# Patient Record
Sex: FEMALE | Race: WHITE | NOT HISPANIC OR LATINO | Employment: OTHER | URBAN - METROPOLITAN AREA
[De-identification: names, ages, dates, MRNs, and addresses within clinical notes are randomized per-mention and may not be internally consistent; named-entity substitution may affect disease eponyms.]

---

## 2022-02-14 ENCOUNTER — OFFICE VISIT (OUTPATIENT)
Dept: ENDOCRINOLOGY | Facility: CLINIC | Age: 77
End: 2022-02-14

## 2022-02-14 ENCOUNTER — LAB (OUTPATIENT)
Dept: LAB | Facility: HOSPITAL | Age: 77
End: 2022-02-14

## 2022-02-14 VITALS
HEIGHT: 62 IN | BODY MASS INDEX: 30.91 KG/M2 | WEIGHT: 168 LBS | OXYGEN SATURATION: 96 % | HEART RATE: 98 BPM | SYSTOLIC BLOOD PRESSURE: 120 MMHG | DIASTOLIC BLOOD PRESSURE: 74 MMHG

## 2022-02-14 DIAGNOSIS — E06.3 AUTOIMMUNE HYPOTHYROIDISM: Primary | ICD-10-CM

## 2022-02-14 PROCEDURE — 84439 ASSAY OF FREE THYROXINE: CPT | Performed by: INTERNAL MEDICINE

## 2022-02-14 PROCEDURE — 99204 OFFICE O/P NEW MOD 45 MIN: CPT | Performed by: INTERNAL MEDICINE

## 2022-02-14 PROCEDURE — 84480 ASSAY TRIIODOTHYRONINE (T3): CPT | Performed by: INTERNAL MEDICINE

## 2022-02-14 PROCEDURE — 84443 ASSAY THYROID STIM HORMONE: CPT | Performed by: INTERNAL MEDICINE

## 2022-02-14 RX ORDER — CHLORAL HYDRATE 500 MG
CAPSULE ORAL
COMMUNITY

## 2022-02-14 RX ORDER — THYROID,PORK 90 MG
TABLET ORAL
COMMUNITY
Start: 2021-12-29 | End: 2022-02-16 | Stop reason: DRUGHIGH

## 2022-02-14 RX ORDER — DIPHENOXYLATE HYDROCHLORIDE AND ATROPINE SULFATE 2.5; .025 MG/1; MG/1
TABLET ORAL
COMMUNITY

## 2022-02-14 RX ORDER — LOSARTAN POTASSIUM 100 MG/1
100 TABLET ORAL DAILY
COMMUNITY

## 2022-02-14 RX ORDER — LISINOPRIL 40 MG/1
40 TABLET ORAL DAILY
COMMUNITY
End: 2022-12-13

## 2022-02-15 LAB
T3 SERPL-MCNC: 154 NG/DL (ref 80–200)
T4 FREE SERPL-MCNC: 0.77 NG/DL (ref 0.93–1.7)
TSH SERPL DL<=0.05 MIU/L-ACNC: 0.51 UIU/ML (ref 0.27–4.2)

## 2022-02-16 RX ORDER — LEVOTHYROXINE SODIUM 0.05 MG/1
TABLET ORAL
Qty: 30 TABLET | Refills: 5 | Status: SHIPPED | OUTPATIENT
Start: 2022-02-16 | End: 2022-07-28 | Stop reason: SDUPTHER

## 2022-02-16 RX ORDER — THYROID 60 MG
TABLET ORAL
Qty: 30 TABLET | Refills: 5 | Status: SHIPPED | OUTPATIENT
Start: 2022-02-16 | End: 2022-07-28 | Stop reason: SDUPTHER

## 2022-04-20 ENCOUNTER — TELEPHONE (OUTPATIENT)
Dept: ENDOCRINOLOGY | Facility: CLINIC | Age: 77
End: 2022-04-20

## 2022-04-20 NOTE — TELEPHONE ENCOUNTER
Pt called to say that she needs a refill on her armour 60 and levothyroxine 50  But she needs you to look at the labs from her PCP  (they are scanned in to the chart)  please call the pt back to let her know if we are changing the doses

## 2022-04-20 NOTE — TELEPHONE ENCOUNTER
Spoke to patient about lab results. Let pt know she has refills on her prescription. Will have her continue the same doses of [place] 60 mg + levothyroxine 50 mcg qAM.   Signed: Molly Ricardo MD

## 2022-07-11 ENCOUNTER — LAB (OUTPATIENT)
Dept: LAB | Facility: HOSPITAL | Age: 77
End: 2022-07-11

## 2022-07-11 ENCOUNTER — OFFICE VISIT (OUTPATIENT)
Dept: ENDOCRINOLOGY | Facility: CLINIC | Age: 77
End: 2022-07-11

## 2022-07-11 VITALS
HEIGHT: 62 IN | OXYGEN SATURATION: 98 % | HEART RATE: 85 BPM | SYSTOLIC BLOOD PRESSURE: 118 MMHG | BODY MASS INDEX: 31.1 KG/M2 | DIASTOLIC BLOOD PRESSURE: 72 MMHG | WEIGHT: 169 LBS

## 2022-07-11 DIAGNOSIS — E06.3 AUTOIMMUNE HYPOTHYROIDISM: Primary | ICD-10-CM

## 2022-07-11 PROBLEM — M81.0 AGE-RELATED OSTEOPOROSIS WITHOUT CURRENT PATHOLOGICAL FRACTURE: Status: ACTIVE | Noted: 2019-08-01

## 2022-07-11 PROBLEM — I10 ESSENTIAL HYPERTENSION: Status: ACTIVE | Noted: 2019-04-19

## 2022-07-11 PROBLEM — C44.92 SCC (SQUAMOUS CELL CARCINOMA): Status: ACTIVE | Noted: 2017-03-15

## 2022-07-11 LAB
T3 SERPL-MCNC: 156 NG/DL (ref 80–200)
T4 FREE SERPL-MCNC: 1.17 NG/DL (ref 0.93–1.7)
TSH SERPL DL<=0.05 MIU/L-ACNC: 0.05 UIU/ML (ref 0.27–4.2)

## 2022-07-11 PROCEDURE — 84439 ASSAY OF FREE THYROXINE: CPT | Performed by: INTERNAL MEDICINE

## 2022-07-11 PROCEDURE — 99213 OFFICE O/P EST LOW 20 MIN: CPT | Performed by: INTERNAL MEDICINE

## 2022-07-11 PROCEDURE — 84480 ASSAY TRIIODOTHYRONINE (T3): CPT | Performed by: INTERNAL MEDICINE

## 2022-07-11 PROCEDURE — 84443 ASSAY THYROID STIM HORMONE: CPT | Performed by: INTERNAL MEDICINE

## 2022-07-11 RX ORDER — CYCLOSPORINE 0.5 MG/ML
1 EMULSION OPHTHALMIC 2 TIMES DAILY
COMMUNITY

## 2022-07-11 NOTE — PROGRESS NOTES
Chief Complaint   Patient presents with   • Hypothyroidism     Follow up        HPI:   Lauren Yanes is a 77 y.o.female who returns to endocrine clinic for follow-up evaluation of her hypothyroidism. Last visit 02/14/2022.  Her history is as follows:    Interim Events:   - Pt reports eating a gluten-free diet has helped some of her symptoms  -Patient is taking thyroid medications as directed    1) autoimmune hypothyroidism:  - Patient was initially diagnosed with hyperthyroidism at age 15.  She was treated with radioactive iodine at that time but did not develop post ablative hypothyroidism.   - In the 1990s, proximately 30 years later, patient was diagnosed with hypothyroidism treated with levothyroxine initially.  - Her hypothyroidism was previously managed by endocrinologist, Dr. Chante Stoll, [facility].  He is no longer at the practice.  -In 2017, patient's levothyroxine was changed to [place] Thyroid.  She reports she felt better on thyroid hormone extract initially.   - More recently she has noted more fatigue despite adequate sleep    Initial Endocrine Visit with me: (02/14/2022). Pt was on [place] 90 mg daily. After discussing treatment options and limitations of thyroid hormone extracts, change patient's regimen to [place] 60 mg daily plus levothyroxine 50 mcg daily.    Current dose: [place] thyroid 60 mg + levothyroxine 50 mcg daily  -Takes both tabs in a.m. on an empty stomach  -No missed doses  -Is not on a biotin supplement    Labs: 04/15/2022 TSH 0.171 (0.450 - 4.500), free T4 1.00, T3 123 on this dosing.    2) h/o thyroid nodules:  - January 2, 1990: Status post FNA of a thyroid nodule.  Cytology was consistent with thyroiditis  -Patient has had multiple thyroid ultrasounds from 0164-3914.  Each ultrasound describes a stable atrophic appearance of the gland consistent with chronic thyroiditis.  The 2017 ultrasound also described < 1 cm nodule in the left lobe.   - (10/15/2015) [facility]:   FNA Rt thyroid nodule: Few benign follicular epithelial cells and colloid consistent with benign nodule  FNA Lt thyroid nodule: Abundant colloid and scant benign follicular epithelial cells consistent with a colloid nodule.    Review of Systems   Constitutional: Positive for fatigue (intermittent).   HENT: Positive for rhinorrhea.    Eyes: Positive for pain and itching. Negative for photophobia and visual disturbance.   Respiratory: Negative.    Cardiovascular: Negative.    Gastrointestinal: Negative.    Endocrine: Negative.    Genitourinary: Positive for dyspareunia, enuresis and frequency.   Musculoskeletal: Positive for arthralgias, back pain, joint swelling, myalgias, neck pain and neck stiffness.   Skin: Negative.    Allergic/Immunologic: Positive for environmental allergies. Negative for immunocompromised state.   Neurological: Negative.  Negative for light-headedness.   Hematological: Negative.    Psychiatric/Behavioral: Negative.  Negative for sleep disturbance.       The following portions of the patient's history were reviewed and updated as appropriate: allergies, current medications, past family history, past medical history, past social history, past surgical history and problem list.      /72   Pulse 85   Ht 157.5 cm (62\")   Wt 76.7 kg (169 lb)   SpO2 98%   BMI 30.91 kg/m²   Physical Exam  Vitals reviewed.   Constitutional:       General: She is not in acute distress.     Appearance: She is well-developed. She is not diaphoretic.   HENT:      Head: Normocephalic.   Eyes:      Conjunctiva/sclera: Conjunctivae normal.      Pupils: Pupils are equal, round, and reactive to light.   Neck:      Thyroid: No thyromegaly.      Trachea: No tracheal deviation.      Comments: No palpable thyroid nodules    Cardiovascular:      Rate and Rhythm: Normal rate and regular rhythm.      Heart sounds: Normal heart sounds. No murmur heard.  Pulmonary:      Effort: Pulmonary effort is normal. No " respiratory distress.      Breath sounds: Normal breath sounds.   Lymphadenopathy:      Cervical: No cervical adenopathy.   Skin:     General: Skin is warm and dry.      Findings: No erythema.   Neurological:      Mental Status: She is alert and oriented to person, place, and time.      Cranial Nerves: No cranial nerve deficit.   Psychiatric:         Behavior: Behavior normal.       LABS/IMAGING: outside records reviewed and summarized in HPI  Results for orders placed or performed in visit on 07/11/22   T3    Specimen: Blood   Result Value Ref Range    T3, Total 156.0 80.0 - 200.0 ng/dl   T4, Free    Specimen: Blood   Result Value Ref Range    Free T4 1.17 0.93 - 1.70 ng/dL   TSH    Specimen: Blood   Result Value Ref Range    TSH 0.053 (L) 0.270 - 4.200 uIU/mL       ASSESSMENT/PLAN:  1) autoimmune hypothyroidism:  - Clinically euthyroid on exam.    - Discussed again the various thyroid hormone formulations available.  Discussed the limitations of thyroid hormone extracts including [place] Thyroid and NP thyroid.  Specifically discussed the inconsistent ratio of T4 and T3 and thyroid hormone extracts that provide adequate or too much T3 and insufficient amounts of T4.   .- Reviewed treatment options with the patient. She would like to continue with her current regimen.    - Will continue [place] Thyroid to 60 mg qAM (7 days per week) plus levothyroxine   - Will decrease levothyroxine to 50 mcg daily 6 out of 7 days per week.    Prescriptions for both medications sent to her pharmacy  - Will check TFT's at her next visit  - Reviewed proper thyroid hormone administration, and factors to avoid that decrease medication potency and medication absorption.     RTC 5 months     Signed: Molly Ricardo MD

## 2022-07-28 RX ORDER — LEVOTHYROXINE SODIUM 0.05 MG/1
TABLET ORAL
Qty: 90 TABLET | Refills: 3 | Status: SHIPPED | OUTPATIENT
Start: 2022-07-28

## 2022-07-28 RX ORDER — THYROID 60 MG
TABLET ORAL
Qty: 90 TABLET | Refills: 3 | Status: SHIPPED | OUTPATIENT
Start: 2022-07-28

## 2022-12-13 ENCOUNTER — OFFICE VISIT (OUTPATIENT)
Dept: ENDOCRINOLOGY | Facility: CLINIC | Age: 77
End: 2022-12-13

## 2022-12-13 ENCOUNTER — LAB (OUTPATIENT)
Dept: LAB | Facility: HOSPITAL | Age: 77
End: 2022-12-13

## 2022-12-13 VITALS
BODY MASS INDEX: 29.44 KG/M2 | HEIGHT: 62 IN | HEART RATE: 73 BPM | WEIGHT: 160 LBS | SYSTOLIC BLOOD PRESSURE: 144 MMHG | OXYGEN SATURATION: 98 % | DIASTOLIC BLOOD PRESSURE: 76 MMHG

## 2022-12-13 DIAGNOSIS — E06.3 AUTOIMMUNE HYPOTHYROIDISM: Primary | ICD-10-CM

## 2022-12-13 PROBLEM — I25.10 CORONARY ARTERY DISEASE INVOLVING NATIVE CORONARY ARTERY OF NATIVE HEART WITHOUT ANGINA PECTORIS: Status: ACTIVE | Noted: 2022-12-13

## 2022-12-13 LAB
T3 SERPL-MCNC: 185 NG/DL (ref 80–200)
T4 FREE SERPL-MCNC: 1.06 NG/DL (ref 0.93–1.7)
TSH SERPL DL<=0.05 MIU/L-ACNC: 0.11 UIU/ML (ref 0.27–4.2)

## 2022-12-13 PROCEDURE — 84439 ASSAY OF FREE THYROXINE: CPT | Performed by: INTERNAL MEDICINE

## 2022-12-13 PROCEDURE — 84480 ASSAY TRIIODOTHYRONINE (T3): CPT | Performed by: INTERNAL MEDICINE

## 2022-12-13 PROCEDURE — 99213 OFFICE O/P EST LOW 20 MIN: CPT | Performed by: INTERNAL MEDICINE

## 2022-12-13 PROCEDURE — 84443 ASSAY THYROID STIM HORMONE: CPT | Performed by: INTERNAL MEDICINE

## 2022-12-13 RX ORDER — FUROSEMIDE 20 MG/1
TABLET ORAL
COMMUNITY
Start: 2022-11-29

## 2022-12-13 RX ORDER — METOPROLOL SUCCINATE 25 MG/1
TABLET, EXTENDED RELEASE ORAL
COMMUNITY
Start: 2022-10-25

## 2022-12-13 RX ORDER — ATORVASTATIN CALCIUM 40 MG/1
40 TABLET, FILM COATED ORAL DAILY
COMMUNITY

## 2022-12-13 RX ORDER — CLOPIDOGREL BISULFATE 75 MG/1
TABLET ORAL
COMMUNITY
Start: 2022-11-18

## 2022-12-13 NOTE — PROGRESS NOTES
Chief Complaint   Patient presents with   • Autoimmune hypothyroidism     Follow up        HPI:   Lauren Yanes is a 77 y.o.female who returns to endocrine clinic for follow-up evaluation of her hypothyroidism. Last visit 07/11/2022.  Her history is as follows:    Interim Events:   - 08/04/2022 pt had MI. Had 2 stents placed in her LAD and 1 stent in the circumflex artery at [facility]. Was taking Brillinta but stopped medication due to SOA while on the medication.    - Patient is taking thyroid medications as directed    1) autoimmune hypothyroidism:  - Patient was initially diagnosed with hyperthyroidism at age 15.  She was treated with radioactive iodine at that time but did not develop post ablative hypothyroidism.   - In the 1990s, proximately 30 years later, patient was diagnosed with hypothyroidism treated with levothyroxine initially.  - Her hypothyroidism was previously managed by endocrinologist, Dr. Chante Stoll, [facility].  He is no longer at that practice.  -In 2017, patient's levothyroxine was changed to [place] Thyroid.  She reports she felt better on thyroid hormone extract initially.   - More recently she has noted more fatigue despite adequate sleep    Initial Endocrine Visit with me: (02/14/2022). Pt was on [place] 90 mg daily. After discussing treatment options and limitations of thyroid hormone extracts, change patient's regimen to [place] 60 mg daily plus levothyroxine 50 mcg daily.    Current dose: [place] thyroid 60 mg daily (7days/week)+ levothyroxine 50 mcg daily (6 out of 7 days per week)  -Takes both tabs in a.m. on an empty stomach  -No missed doses  -Is not on a biotin supplement    2) h/o thyroid nodules:  - January 2, 1990: Status post FNA of a thyroid nodule.  Cytology was consistent with thyroiditis  -Patient has had multiple thyroid ultrasounds from 8283-4606.  Each ultrasound describes a stable atrophic appearance of the gland consistent with chronic  "thyroiditis.  The 2017 ultrasound also described < 1 cm nodule in the left lobe.   - (10/15/2015) [facility]:   FNA Rt thyroid nodule: Few benign follicular epithelial cells and colloid consistent with benign nodule  FNA Lt thyroid nodule: Abundant colloid and scant benign follicular epithelial cells consistent with a colloid nodule.    Review of Systems   Constitutional: Negative for fatigue.   HENT: Negative.  Negative for rhinorrhea.    Eyes: Positive for itching. Negative for photophobia, pain and visual disturbance.   Respiratory: Negative.  Negative for chest tightness and shortness of breath.    Cardiovascular: Negative.    Gastrointestinal: Negative.    Endocrine: Negative.    Genitourinary: Positive for enuresis and frequency.   Musculoskeletal: Positive for arthralgias, back pain, joint swelling and myalgias. Negative for neck pain and neck stiffness.   Skin: Negative.    Allergic/Immunologic: Positive for environmental allergies. Negative for immunocompromised state.   Neurological: Negative.  Negative for light-headedness.   Hematological: Negative.    Psychiatric/Behavioral: Negative.  Negative for sleep disturbance.       The following portions of the patient's history were reviewed and updated as appropriate: allergies, current medications, past family history, past medical history, past social history, past surgical history and problem list.      /76   Pulse 73   Ht 157.5 cm (62\")   Wt 72.6 kg (160 lb)   SpO2 98%   BMI 29.26 kg/m²   Physical Exam  Vitals reviewed.   Constitutional:       General: She is not in acute distress.     Appearance: She is well-developed. She is not diaphoretic.   HENT:      Head: Normocephalic.   Eyes:      Conjunctiva/sclera: Conjunctivae normal.      Pupils: Pupils are equal, round, and reactive to light.   Neck:      Thyroid: No thyromegaly.      Trachea: No tracheal deviation.      Comments: No palpable thyroid " nodules    Cardiovascular:      Rate and Rhythm: Normal rate and regular rhythm.      Heart sounds: Normal heart sounds. No murmur heard.  Pulmonary:      Effort: Pulmonary effort is normal. No respiratory distress.      Breath sounds: Normal breath sounds.   Lymphadenopathy:      Cervical: No cervical adenopathy.   Skin:     General: Skin is warm and dry.      Findings: No erythema.   Neurological:      Mental Status: She is alert and oriented to person, place, and time.      Cranial Nerves: No cranial nerve deficit.   Psychiatric:         Behavior: Behavior normal.       LABS/IMAGING: outside records reviewed and summarized in HPI  Results for orders placed or performed in visit on 12/13/22   TSH    Specimen: Blood   Result Value Ref Range    TSH 0.108 (L) 0.270 - 4.200 uIU/mL   T3    Specimen: Blood   Result Value Ref Range    T3, Total 185.0 80.0 - 200.0 ng/dl   T4, Free    Specimen: Blood   Result Value Ref Range    Free T4 1.06 0.93 - 1.70 ng/dL       ASSESSMENT/PLAN:  1) autoimmune hypothyroidism:  - Clinically euthyroid on exam.    - Have discussed the various thyroid hormone formulations available.  Discussed the limitations of thyroid hormone extracts including [place] Thyroid and NP thyroid.  Specifically discussed the inconsistent ratio of T4 and T3 and thyroid hormone extracts that provide adequate or too much T3 and insufficient amounts of T4.   - Reviewed treatment options with the patient. She would like to continue with her current drug formulations.    - Will decrease regimen to:   [place] Thyroid to 60 mg qAM + levothyroxine 50 mcg daily, Mon - Sat, skipping both on Sundays.    - Will mail lab orders to have TFT's checked in 8 weeks locally.   - Reviewed proper thyroid hormone administration, and factors to avoid that decrease medication potency and medication absorption.     RTC 6 months    Signed: Molly Ricardo MD

## 2022-12-15 ENCOUNTER — TELEPHONE (OUTPATIENT)
Dept: ENDOCRINOLOGY | Facility: CLINIC | Age: 77
End: 2022-12-15

## 2022-12-15 NOTE — TELEPHONE ENCOUNTER
----- Message from Molly Ricardo MD sent at 12/15/2022  2:54 PM EST -----  Regarding: Mail lab slip  Please mail lab slip for TSH, free T4, T3 to patient. Orders printed to printer [id number].  Signed: Molly Ricardo MD

## 2023-02-16 DIAGNOSIS — E06.3 AUTOIMMUNE HYPOTHYROIDISM: ICD-10-CM

## 2023-03-09 ENCOUNTER — TELEPHONE (OUTPATIENT)
Dept: ENDOCRINOLOGY | Facility: CLINIC | Age: 78
End: 2023-03-09
Payer: MEDICARE

## 2023-03-09 NOTE — TELEPHONE ENCOUNTER
Spoke to patient about lab results. Made the following changes to her thyroid hormone doses:  [place] thyroid 60 mg daily (Mon -Fri)  Levothyroxine 50 mcg daily (all week)  Will recheck labs at her f/u in 06/2023.  Signed: Molly Ricardo MD

## 2023-12-11 ENCOUNTER — OFFICE VISIT (OUTPATIENT)
Dept: ENDOCRINOLOGY | Facility: CLINIC | Age: 78
End: 2023-12-11
Payer: MEDICARE

## 2023-12-11 VITALS
OXYGEN SATURATION: 99 % | SYSTOLIC BLOOD PRESSURE: 130 MMHG | WEIGHT: 169 LBS | HEART RATE: 71 BPM | DIASTOLIC BLOOD PRESSURE: 70 MMHG | BODY MASS INDEX: 31.1 KG/M2 | HEIGHT: 62 IN

## 2023-12-11 DIAGNOSIS — E06.3 AUTOIMMUNE HYPOTHYROIDISM: Primary | ICD-10-CM

## 2023-12-11 PROCEDURE — 3075F SYST BP GE 130 - 139MM HG: CPT | Performed by: INTERNAL MEDICINE

## 2023-12-11 PROCEDURE — 3078F DIAST BP <80 MM HG: CPT | Performed by: INTERNAL MEDICINE

## 2023-12-11 PROCEDURE — 99213 OFFICE O/P EST LOW 20 MIN: CPT | Performed by: INTERNAL MEDICINE

## 2023-12-11 RX ORDER — AMLODIPINE BESYLATE 5 MG/1
1 TABLET ORAL DAILY
COMMUNITY
Start: 2023-11-09

## 2023-12-11 NOTE — PROGRESS NOTES
Chief Complaint   Patient presents with    Hypothyroidism     Follow-up        HPI:   Lauren Yanes is a 78 y.o.female who returns to endocrine clinic for follow-up evaluation of her hypothyroidism. Last visit 06/20/2023.  Her history is as follows:    Interim Events:   - Had labs completed at outside facility on 10/26/2023, see below.  - Patient is taking thyroid medications as directed  - Has had tingling/burning in her legs, related to her back  - Has rt sided facial pain. Was told by her dentist she has trigeminal neuralgia  - pt stopped atorvastatin 40 mg     1) autoimmune hypothyroidism:  - Patient was initially diagnosed with hyperthyroidism at age 15.  She was treated with radioactive iodine at that time but did not develop post ablative hypothyroidism.   - In the 1990s, proximately 30 years later, patient was diagnosed with hypothyroidism treated with levothyroxine initially.  - Her hypothyroidism was previously managed by endocrinologist, Dr. Chante Stoll, [facility].  He is no longer at that practice.  -In 2017, patient's levothyroxine was changed to [place] Thyroid.  She reports she felt better on thyroid hormone extract initially.   - More recently she has noted more fatigue despite adequate sleep    Initial Endocrine Visit with me: (02/14/2022). Pt was on [place] 90 mg daily. After discussing treatment options and limitations of thyroid hormone extracts. Pt wanted to stay on [place] thyroid. Therefore changed patient's regimen to [place] 60 mg daily plus levothyroxine 50 mcg daily.    Current dose: [place] thyroid 60 mg daily (Monday - Friday)+ levothyroxine 50 mcg daily (7 days per week)  -Takes both tabs in a.m. on an empty stomach  -No missed doses  -Is not on a biotin supplement    2) h/o thyroid nodules:  - January 2, 1990: Status post FNA of a thyroid nodule.  Cytology was consistent with thyroiditis  -Patient has had multiple thyroid ultrasounds from 8744-2829.  Each ultrasound describes a  "stable atrophic appearance of the gland consistent with chronic thyroiditis.  The 2017 ultrasound also described < 1 cm nodule in the left lobe.   - (10/15/2015) [facility]:   FNA Rt thyroid nodule: Few benign follicular epithelial cells and colloid consistent with benign nodule  FNA Lt thyroid nodule: Abundant colloid and scant benign follicular epithelial cells consistent with a colloid nodule.    Other History:  - 08/04/2022 pt had MI. Had 2 stents placed in her LAD and 1 stent in the circumflex artery at [facility]. Was taking Brillinta but stopped medication due to SOA while on the medication.      Review of Systems   Constitutional:  Negative for fatigue.   HENT: Negative.  Negative for rhinorrhea.    Eyes: Negative.  Negative for photophobia, pain, itching and visual disturbance.   Respiratory: Negative.  Negative for chest tightness and shortness of breath.    Cardiovascular: Negative.    Gastrointestinal: Negative.    Endocrine: Negative.    Genitourinary:  Positive for enuresis. Negative for frequency.   Musculoskeletal:  Positive for arthralgias, back pain and joint swelling. Negative for myalgias, neck pain and neck stiffness.   Skin: Negative.    Allergic/Immunologic: Positive for environmental allergies. Negative for immunocompromised state.   Neurological:  Positive for numbness. Negative for light-headedness.   Hematological: Negative.    Psychiatric/Behavioral: Negative.  Negative for sleep disturbance.        The following portions of the patient's history were reviewed and updated as appropriate: allergies, current medications, past family history, past medical history, past social history, past surgical history and problem list.      /70   Pulse 71   Ht 157.5 cm (62\")   Wt 76.7 kg (169 lb)   SpO2 99%   BMI 30.91 kg/m²   Physical Exam  Vitals reviewed.   Constitutional:       General: She is not in acute distress.     Appearance: She is " well-developed. She is not diaphoretic.   HENT:      Head: Normocephalic.   Eyes:      Conjunctiva/sclera: Conjunctivae normal.      Pupils: Pupils are equal, round, and reactive to light.   Neck:      Thyroid: No thyromegaly.      Trachea: No tracheal deviation.      Comments: No palpable thyroid nodules    Cardiovascular:      Rate and Rhythm: Normal rate and regular rhythm.      Heart sounds: Normal heart sounds. No murmur heard.  Pulmonary:      Effort: Pulmonary effort is normal. No respiratory distress.      Breath sounds: Normal breath sounds.   Lymphadenopathy:      Cervical: No cervical adenopathy.   Skin:     General: Skin is warm and dry.      Findings: No erythema.   Neurological:      Mental Status: She is alert and oriented to person, place, and time.      Cranial Nerves: No cranial nerve deficit.   Psychiatric:         Behavior: Behavior normal.       LABS/IMAGING: outside records reviewed and summarized in HPI    Outside facility labs:  (10/26/2023) TSH 1.890 (0.450 - 4.500), total T4 7.5 (4.5 - 12.0), total T3 174 (71 - 180    ASSESSMENT/PLAN:  1) autoimmune hypothyroidism:  - Clinically euthyroid on exam. (10/26/2023) TFT's WNL  - Continue [place] thyroid 60 mg daily (Monday - Friday)+ levothyroxine 50 mcg daily (7 days per week)  - Rxs renewed  - Reviewed proper thyroid hormone administration, and factors to avoid that decrease medication potency and medication absorption.     RTC 6 months    Electronically Signed: Molly Ricardo MD

## 2024-06-11 ENCOUNTER — TELEPHONE (OUTPATIENT)
Dept: ENDOCRINOLOGY | Facility: CLINIC | Age: 79
End: 2024-06-11

## 2024-06-11 ENCOUNTER — OFFICE VISIT (OUTPATIENT)
Dept: ENDOCRINOLOGY | Facility: CLINIC | Age: 79
End: 2024-06-11
Payer: MEDICARE

## 2024-06-11 VITALS
BODY MASS INDEX: 30.95 KG/M2 | HEIGHT: 62 IN | WEIGHT: 168.2 LBS | HEART RATE: 65 BPM | SYSTOLIC BLOOD PRESSURE: 138 MMHG | DIASTOLIC BLOOD PRESSURE: 68 MMHG | OXYGEN SATURATION: 98 %

## 2024-06-11 DIAGNOSIS — E06.3 AUTOIMMUNE HYPOTHYROIDISM: ICD-10-CM

## 2024-06-11 PROCEDURE — 3075F SYST BP GE 130 - 139MM HG: CPT | Performed by: INTERNAL MEDICINE

## 2024-06-11 PROCEDURE — 99213 OFFICE O/P EST LOW 20 MIN: CPT | Performed by: INTERNAL MEDICINE

## 2024-06-11 PROCEDURE — 3078F DIAST BP <80 MM HG: CPT | Performed by: INTERNAL MEDICINE

## 2024-06-11 RX ORDER — LEVOTHYROXINE SODIUM 0.05 MG/1
TABLET ORAL
Qty: 90 TABLET | Refills: 3 | Status: SHIPPED | OUTPATIENT
Start: 2024-06-11

## 2024-06-11 RX ORDER — THYROID 60 MG
TABLET ORAL
Qty: 90 TABLET | Refills: 3 | Status: SHIPPED | OUTPATIENT
Start: 2024-06-11

## 2024-06-11 NOTE — TELEPHONE ENCOUNTER
----- Message from Molly Ricardo sent at 6/11/2024 10:28 AM EDT -----  Regarding: recent labs from PCP's office  Please get copy of recent labs from pt's PCP's office  Thanks  MD

## 2024-06-11 NOTE — PROGRESS NOTES
Chief Complaint   Patient presents with    Autoimmune hypothyroidism       HPI:   Lauren Yanes is a 78 y.o.female who returns to endocrine clinic for follow-up evaluation of her hypothyroidism. Last visit 12/11/2023.  Her history is as follows:    Interim Events:   - pt had labs completed 04/26/2024, see below  - main complaint is that of chronic pain. Has seen rheumatology and was diagnosed with osteoarthritis.  Has seen orthopedic surgery for OA of her hips and for bulging disc.  Has known osteoporosis found on DEXA scan in 2019 ordered by her PCP; however, patient declined treatment.    1) autoimmune hypothyroidism:  - Patient was initially diagnosed with hyperthyroidism at age 15.  She was treated with radioactive iodine at that time but did not develop post ablative hypothyroidism.   - In the 1990s, proximately 30 years later, patient was diagnosed with hypothyroidism treated with levothyroxine initially.  - Her hypothyroidism was previously managed by endocrinologist, Dr. Chante Stoll, [facility].  He is no longer at that practice.  -In 2017, patient's levothyroxine was changed to [place] Thyroid.  She reports she felt better on thyroid hormone extract initially.   - More recently she has noted more fatigue despite adequate sleep    Initial Endocrine Visit with me: (02/14/2022). Pt was on [place] 90 mg daily. After discussing treatment options and limitations of thyroid hormone extracts. Pt wanted to stay on [place] thyroid. Therefore changed patient's regimen to [place] 60 mg daily plus levothyroxine 50 mcg daily.    Current dose: [place] thyroid 60 mg daily (Monday - Friday)+ levothyroxine 50 mcg daily (7 days per week)  -Takes both tabs in a.m. on an empty stomach  -No missed doses  -Is not on a biotin supplement    2) h/o thyroid nodules:  - January 2, 1990: Status post FNA of a thyroid nodule.  Cytology was consistent with thyroiditis  -Patient has had multiple thyroid ultrasounds from 2480-3546.  Each  "ultrasound describes a stable atrophic appearance of the gland consistent with chronic thyroiditis.  The 2017 ultrasound also described < 1 cm nodule in the left lobe.   - (10/15/2015) [facility]:   FNA Rt thyroid nodule: Few benign follicular epithelial cells and colloid consistent with benign nodule  FNA Lt thyroid nodule: Abundant colloid and scant benign follicular epithelial cells consistent with a colloid nodule.    Other History:  - 08/04/2022 pt had MI. Had 2 stents placed in her LAD and 1 stent in the circumflex artery at [facility]. Was taking Brillinta but stopped medication due to SOA while on the medication.      Review of Systems   Constitutional:  Negative for fatigue.   HENT: Negative.  Negative for rhinorrhea.    Eyes: Negative.  Negative for photophobia, pain, itching and visual disturbance.   Respiratory: Negative.  Negative for chest tightness and shortness of breath.    Cardiovascular: Negative.    Gastrointestinal: Negative.    Endocrine: Negative.    Genitourinary:  Positive for enuresis. Negative for frequency.   Musculoskeletal:  Positive for arthralgias, back pain, joint swelling and myalgias. Negative for neck pain and neck stiffness.   Skin: Negative.    Allergic/Immunologic: Positive for environmental allergies. Negative for immunocompromised state.   Neurological:  Positive for numbness. Negative for light-headedness.   Hematological: Negative.    Psychiatric/Behavioral: Negative.  Negative for sleep disturbance.      The following portions of the patient's history were reviewed and updated as appropriate: allergies, current medications, past family history, past medical history, past social history, past surgical history and problem list.      /68   Pulse 65   Ht 157.5 cm (62.01\")   Wt 76.3 kg (168 lb 3.2 oz)   SpO2 98%   BMI 30.76 kg/m²   Physical Exam  Vitals reviewed.   Constitutional:       General: She is not in acute " distress.     Appearance: She is well-developed. She is not diaphoretic.   HENT:      Head: Normocephalic.   Eyes:      Conjunctiva/sclera: Conjunctivae normal.      Pupils: Pupils are equal, round, and reactive to light.   Neck:      Thyroid: No thyromegaly.      Trachea: No tracheal deviation.      Comments: No palpable thyroid nodules    Cardiovascular:      Rate and Rhythm: Normal rate and regular rhythm.      Heart sounds: Normal heart sounds. No murmur heard.  Pulmonary:      Effort: Pulmonary effort is normal. No respiratory distress.      Breath sounds: Normal breath sounds.   Lymphadenopathy:      Cervical: No cervical adenopathy.   Skin:     General: Skin is warm and dry.      Findings: No erythema.   Neurological:      Mental Status: She is alert and oriented to person, place, and time.      Cranial Nerves: No cranial nerve deficit.   Psychiatric:         Behavior: Behavior normal.       LABS/IMAGING: outside records reviewed and summarized in HPI    Outside facility labs:  (04/26/2024) TSH 0.628 (0.450 - 4.500), free T4 0.96 (0.82 - 1.77)    ASSESSMENT/PLAN:  1) autoimmune hypothyroidism:  - Clinically euthyroid on exam. (04/26/2024) TFT's WNL  - Continue [place] thyroid 60 mg daily (Monday - Friday)+ levothyroxine 50 mcg daily (7 days per week)  - Rxs renewed.   -Have discussed treatment options at her prior visits.  Have recommended she just take levothyroxine however she reports not liking this medication and preferred thyroid hormone extract such as [place] Thyroid.  Advised patient at prior visits that the [place] Thyroid alone was not providing enough T4, therefore I added levothyroxine to her regimen.   - Reviewed proper thyroid hormone administration, and factors to avoid that decrease medication potency and medication absorption.     RTC 12 months.  Patient to call for repeat thyroid function tests sooner than her planned followed up if she develops any concerning symptoms    Electronically Signed:  Molly Ricardo MD

## 2025-06-17 ENCOUNTER — OFFICE VISIT (OUTPATIENT)
Dept: ENDOCRINOLOGY | Facility: CLINIC | Age: 80
End: 2025-06-17
Payer: MEDICARE

## 2025-06-17 VITALS
DIASTOLIC BLOOD PRESSURE: 70 MMHG | BODY MASS INDEX: 39.39 KG/M2 | HEART RATE: 82 BPM | HEIGHT: 55 IN | SYSTOLIC BLOOD PRESSURE: 122 MMHG | OXYGEN SATURATION: 99 % | WEIGHT: 170.2 LBS

## 2025-06-17 DIAGNOSIS — E06.3 AUTOIMMUNE HYPOTHYROIDISM: Primary | ICD-10-CM

## 2025-06-17 PROCEDURE — 3074F SYST BP LT 130 MM HG: CPT | Performed by: INTERNAL MEDICINE

## 2025-06-17 PROCEDURE — 76536 US EXAM OF HEAD AND NECK: CPT | Performed by: INTERNAL MEDICINE

## 2025-06-17 PROCEDURE — 99213 OFFICE O/P EST LOW 20 MIN: CPT | Performed by: INTERNAL MEDICINE

## 2025-06-17 PROCEDURE — 3078F DIAST BP <80 MM HG: CPT | Performed by: INTERNAL MEDICINE

## 2025-06-17 RX ORDER — THYROID,PORK 90 MG
1 TABLET ORAL DAILY
COMMUNITY
Start: 2025-05-06

## 2025-06-17 NOTE — PROGRESS NOTES
Thyroid Ultrasound Report  [facility]  [place], [place]    Date: 06/17/2025  Indication: autoimmune thyroid disease    Comparison Imaging: none  Clinical History: 80 y.o female with h/o autoimmune hypothyroidism and thyroid nodules on outside imaging. Patient was initially diagnosed with hyperthyroidism at age 15.  She was treated with radioactive iodine at that time but did not develop post ablative hypothyroidism. In the 1990s, proximately 30 years later, patient was diagnosed with hypothyroidism    Real time high resolution imaging of the thyroid gland was performed in transverse and longitudinal planes.  The right lobe measured 3.88 cm in Length x 1.08 cm in AP diameter x 1.33 cm in TV dimension.  The isthmus measured 0.20 cm in thickness.  The left thyroid lobe measured 2.61 cm in length x 0.65 cm in AP diameter x 1.09 cm in TV dimension.    The thyroid gland appeared overall atrophied and hypoechoic with diffusely heterogeneous echotexture.  No discrete nodules were identified in either lobe or the isthmus.  No pathologic lymph nodes were seen.     IMPRESSION:   1) The thyroid gland was small in size by measured dimensions.   2) The thyroid gland appeared overall atrophied and hypoechoic with diffusely heterogeneous echotexture c/w with her h/o radioactive iodine and autoimmune hypothyroidism.  3) No discrete nodules were identified in either lobe or the isthmus.    RECOMMENDATIONS: Repeat Thyroid US recommended if changes in the gland are noted on physical exam or if persistent cervical lymphadenopathy develops.       Electronically Signed: Molly Ricardo MD

## 2025-06-17 NOTE — PROGRESS NOTES
Chief Complaint   Patient presents with    Hypothyroidism     Follow up       HPI:   Lauren Yanes is a 79 y.o.female who returns to endocrine clinic for follow-up evaluation of her hypothyroidism. Last visit 06/11/2024.  Her history is as follows:    Interim Events:   - pt reports having a heart attack on 11/01/2024. On her own, she discontinued the thyroid medications I prescribed and resumed [place] Thyroid 90 mg daily for unclear reasons.  - Of note patient did not notice a difference in regards to her fatigue and arthritis pain on the hormone regimen prescribed last visit despite this regimen providing adequate ratios of T4 and T3  - main complaint is that of chronic pain. Has seen rheumatology and was diagnosed with osteoarthritis.  Has seen orthopedic surgery for OA of her hips and for bulging disc.  Has known osteoporosis found on DEXA scan in 2019 ordered by her PCP; however, patient declined treatment.    1) autoimmune hypothyroidism:  - Patient was initially diagnosed with hyperthyroidism at age 15.  She was treated with radioactive iodine at that time but did not develop post ablative hypothyroidism.   - In the 1990s, proximately 30 years later, patient was diagnosed with hypothyroidism treated with levothyroxine initially.  - Her hypothyroidism was previously managed by endocrinologist, Dr. Chante Stoll, [facility].  He is no longer at that practice.  -In 2017, patient's levothyroxine was changed to [place] Thyroid.  She reports she felt better on thyroid hormone extract initially.   - More recently she has noted more fatigue despite adequate sleep    Initial Endocrine Visit with me: (02/14/2022). Pt was on [place] 90 mg daily. After discussing treatment options and limitations of thyroid hormone extracts. Pt wanted to stay on [place] thyroid. Therefore changed patient's regimen to [place] 60 mg daily plus levothyroxine 50 mcg daily.    Current dose:   - pt reports having a heart attack on 11/01/2024.  On her own, she discontinued the thyroid medications I prescribed and resumed [place] Thyroid 90 mg daily for unclear reasons.  - Of note patient did not notice a difference in regards to her fatigue and arthritis pain on the hormone regimen prescribed last visit despite this regimen providing adequate ratios of T4 and T3  Pt is no longer taking the [place] thyroid 60 mg daily (Monday - Friday)+ levothyroxine 50 mcg daily (7 days per week)      2) h/o thyroid nodules:  - January 2, 1990: Status post FNA of a thyroid nodule.  Cytology was consistent with thyroiditis  -Patient has had multiple thyroid ultrasounds from 8415-5731.  Each ultrasound describes a stable atrophic appearance of the gland consistent with chronic thyroiditis.  The 2017 ultrasound also described < 1 cm nodule in the left lobe.   - (10/15/2015) [facility]:   FNA Rt thyroid nodule: Few benign follicular epithelial cells and colloid consistent with benign nodule  FNA Lt thyroid nodule: Abundant colloid and scant benign follicular epithelial cells consistent with a colloid nodule.    Other History:  - 08/04/2022 pt had MI. Had 2 stents placed in her LAD and 1 stent in the circumflex artery at [facility]. Was taking Brillinta but stopped medication due to SOA while on the medication.      Review of Systems   Constitutional:  Negative for fatigue.   HENT: Negative.  Negative for rhinorrhea.    Eyes: Negative.  Negative for photophobia, pain, itching and visual disturbance.   Respiratory: Negative.  Negative for chest tightness and shortness of breath.    Cardiovascular: Negative.    Gastrointestinal: Negative.    Endocrine: Negative.    Genitourinary:  Positive for enuresis. Negative for frequency.   Musculoskeletal:  Positive for arthralgias, back pain, joint swelling and myalgias. Negative for neck pain and neck stiffness.   Skin: Negative.    Allergic/Immunologic: Positive for environmental allergies.  "Negative for immunocompromised state.   Neurological:  Positive for numbness. Negative for light-headedness.   Hematological: Negative.    Psychiatric/Behavioral: Negative.  Negative for sleep disturbance.        The following portions of the patient's history were reviewed and updated as appropriate: allergies, current medications, past family history, past medical history, past social history, past surgical history and problem list.      /70 (BP Location: Right arm, Patient Position: Sitting, Cuff Size: Adult)   Pulse 82   Ht 62 cm (24.41\")   Wt 77.2 kg (170 lb 3.2 oz)   SpO2 99%   .78 kg/m²   Physical Exam  Vitals reviewed.   Constitutional:       General: She is not in acute distress.     Appearance: She is well-developed. She is not diaphoretic.   HENT:      Head: Normocephalic.   Eyes:      Conjunctiva/sclera: Conjunctivae normal.      Pupils: Pupils are equal, round, and reactive to light.   Neck:      Thyroid: No thyromegaly.      Trachea: No tracheal deviation.      Comments: No palpable thyroid nodules    Cardiovascular:      Rate and Rhythm: Normal rate and regular rhythm.      Heart sounds: Normal heart sounds. No murmur heard.  Pulmonary:      Effort: Pulmonary effort is normal. No respiratory distress.      Breath sounds: Normal breath sounds.   Lymphadenopathy:      Cervical: No cervical adenopathy.   Skin:     General: Skin is warm and dry.      Findings: No erythema.   Neurological:      Mental Status: She is alert and oriented to person, place, and time.      Cranial Nerves: No cranial nerve deficit.   Psychiatric:         Behavior: Behavior normal.     LABS/IMAGING: outside records reviewed and summarized in HPI    Outside facility labs:  (04/25/2025) TSH 2.41 (0.450 - 4.500), free T4 0.67 (0.82 - 1.77), free T3 2.4 on [place] 90 mg daily - not prescribed by me.   (05/2025) TSH 1.03, free T4 0.79- on [place] 90 mg daily - not prescribed by me    PROCEDURE (in office):  Thyroid " Ultrasound Report  [facility]  [place], [place]    Date: 06/17/2025  Indication: autoimmune thyroid disease    Comparison Imaging: none  Clinical History: 80 y.o female with h/o autoimmune hypothyroidism and thyroid nodules on outside imaging. Patient was initially diagnosed with hyperthyroidism at age 15.  She was treated with radioactive iodine at that time but did not develop post ablative hypothyroidism. In the 1990s, proximately 30 years later, patient was diagnosed with hypothyroidism    Real time high resolution imaging of the thyroid gland was performed in transverse and longitudinal planes.  The right lobe measured 3.88 cm in Length x 1.08 cm in AP diameter x 1.33 cm in TV dimension.  The isthmus measured 0.20 cm in thickness.  The left thyroid lobe measured 2.61 cm in length x 0.65 cm in AP diameter x 1.09 cm in TV dimension.    The thyroid gland appeared overall atrophied and hypoechoic with diffusely heterogeneous echotexture.  No discrete nodules were identified in either lobe or the isthmus.  No pathologic lymph nodes were seen.     IMPRESSION:   1) The thyroid gland was small in size by measured dimensions.   2) The thyroid gland appeared overall atrophied and hypoechoic with diffusely heterogeneous echotexture c/w with her h/o radioactive iodine and autoimmune hypothyroidism.  3) No discrete nodules were identified in either lobe or the isthmus.    RECOMMENDATIONS: Repeat Thyroid US recommended if changes in the gland are noted on physical exam or if persistent cervical lymphadenopathy develops.       Electronically Signed: Molly Ricardo MD    ASSESSMENT/PLAN:  1) autoimmune hypothyroidism:  -Have discussed treatment options at her prior visits.  Have recommended she just take levothyroxine however she reports not liking this medication and preferred thyroid hormone extract such as [place] Thyroid.  Advised patient at prior visits that the [place] Thyroid alone was not providing enough  T4, therefore I added levothyroxine to her regimen.     - pt reports having a heart attack on 11/01/2024. On her own, she discontinued the thyroid medications I prescribed and resumed [place] Thyroid 90 mg daily for unclear reasons.   Pt is no longer taking the [place] thyroid 60 mg daily (Monday - Friday)+ levothyroxine 50 mcg daily (7 days per week)  - Of note patient did not notice a difference in regards to her fatigue and arthritis pain on the hormone regimen prescribed last visit despite this regimen providing adequate ratios of T4 and T3  -  Advised patient at prior visits that the [place] Thyroid alone was not providing enough T4    Thyroid US completed in clinic today showed -  1) The thyroid gland was small in size by measured dimensions.   2) The thyroid gland appeared overall atrophied and hypoechoic with diffusely heterogeneous echotexture c/w with her h/o radioactive iodine and autoimmune hypothyroidism.  3) No discrete nodules were identified in either lobe or the isthmus.    RECOMMENDATIONS: Repeat Thyroid US recommended if changes in the gland are noted on physical exam or if persistent cervical lymphadenopathy develops.     Pt advised to RTC as needed. She will resume care with her PCP    Electronically Signed: Molly Ricardo MD